# Patient Record
Sex: OTHER/UNKNOWN | Race: WHITE | URBAN - METROPOLITAN AREA
[De-identification: names, ages, dates, MRNs, and addresses within clinical notes are randomized per-mention and may not be internally consistent; named-entity substitution may affect disease eponyms.]

---

## 2017-08-02 ENCOUNTER — APPOINTMENT (OUTPATIENT)
Dept: URBAN - METROPOLITAN AREA CLINIC 290 | Age: 52
Setting detail: DERMATOLOGY
End: 2017-08-02

## 2017-08-02 DIAGNOSIS — Z41.9 ENCOUNTER FOR PROCEDURE FOR PURPOSES OTHER THAN REMEDYING HEALTH STATE, UNSPECIFIED: ICD-10-CM

## 2017-08-02 PROCEDURE — OTHER COSMETIC CONSULTATION: LHR: OTHER

## 2017-08-02 ASSESSMENT — LOCATION ZONE DERM: LOCATION ZONE: LEG

## 2017-08-02 ASSESSMENT — LOCATION SIMPLE DESCRIPTION DERM: LOCATION SIMPLE: LEFT THIGH

## 2017-08-02 ASSESSMENT — LOCATION DETAILED DESCRIPTION DERM: LOCATION DETAILED: LEFT ANTERIOR PROXIMAL THIGH

## 2017-10-13 ENCOUNTER — APPOINTMENT (OUTPATIENT)
Dept: URBAN - METROPOLITAN AREA CLINIC 290 | Age: 52
Setting detail: DERMATOLOGY
End: 2017-10-16

## 2017-10-13 DIAGNOSIS — Z41.9 ENCOUNTER FOR PROCEDURE FOR PURPOSES OTHER THAN REMEDYING HEALTH STATE, UNSPECIFIED: ICD-10-CM

## 2017-10-13 PROCEDURE — OTHER LASER HAIR REMOVAL: OTHER

## 2017-10-13 ASSESSMENT — LOCATION SIMPLE DESCRIPTION DERM
LOCATION SIMPLE: LEFT UPPER ARM
LOCATION SIMPLE: LEFT THIGH

## 2017-10-13 ASSESSMENT — LOCATION DETAILED DESCRIPTION DERM
LOCATION DETAILED: LEFT ANTERIOR MEDIAL DISTAL UPPER ARM
LOCATION DETAILED: LEFT ANTERIOR PROXIMAL THIGH

## 2017-10-13 ASSESSMENT — LOCATION ZONE DERM
LOCATION ZONE: LEG
LOCATION ZONE: ARM

## 2017-10-13 NOTE — PROCEDURE: LASER HAIR REMOVAL
Pulse Duration: auto
Number Of Prepaid Treatments (Will Not Render If 0): 0
Cooling: Serenity Head
Spot Size: 8 mm
Fluence (Will Not Render If 0): 22
Fluence (Will Not Render If 0): 20
Spot Size: Large S[employer]hire Optics
Total Pulses: 49
Anesthesia Type: 1% lidocaine with epinephrine
Total Pulses: 75
Pre-Procedure: Prior to proceeding the treatment areas were cleaned and all present put on their eye protection.
Price (Use Numbers Only, No Special Characters Or $): 0.00
Pulse Duration: 30 ms
Fluence (Will Not Render If 0): 21
Price (Use Numbers Only, No Special Characters Or $): 300.00
Laser Type: diode 810nm
Post-Procedure Care: Immediate endpoint: perifollicular erythema and edema. Post care reviewed with patient.
Treatment Number: 1
Render Post-Care In The Note: No
Cooling: chill tip
Total Pulses: 42
Detail Level: Zone

## 2017-11-27 ENCOUNTER — APPOINTMENT (OUTPATIENT)
Dept: URBAN - METROPOLITAN AREA CLINIC 290 | Age: 52
Setting detail: DERMATOLOGY
End: 2017-11-27

## 2017-11-27 DIAGNOSIS — Z41.9 ENCOUNTER FOR PROCEDURE FOR PURPOSES OTHER THAN REMEDYING HEALTH STATE, UNSPECIFIED: ICD-10-CM

## 2017-11-27 PROCEDURE — OTHER LASER HAIR REMOVAL: OTHER

## 2017-11-27 ASSESSMENT — LOCATION SIMPLE DESCRIPTION DERM
LOCATION SIMPLE: LEFT THIGH
LOCATION SIMPLE: LEFT UPPER ARM

## 2017-11-27 ASSESSMENT — LOCATION ZONE DERM
LOCATION ZONE: LEG
LOCATION ZONE: ARM

## 2017-11-27 NOTE — PROCEDURE: LASER HAIR REMOVAL
Cooling: Serenity Head
Spot Size: Large S[employer]hire Optics
Number Of Prepaid Treatments (Will Not Render If 0): 0
Pulse Duration: auto
Laser Type: diode 810nm
Total Pulses: 42
Fluence (Will Not Render If 0): 20
Pulse Duration: 30 ms
Pre-Procedure: Prior to proceeding the treatment areas were cleaned and all present put on their eye protection.
Spot Size: 8 mm
Price (Use Numbers Only, No Special Characters Or $): 180.00
Post-Procedure Care: Immediate endpoint: perifollicular erythema and edema. Post care reviewed with patient.
Detail Level: Zone
Total Pulses: 52
Treatment Number: 2
Fluence (Will Not Render If 0): 23
Render Post-Care In The Note: No
Fluence (Will Not Render If 0): 22
Anesthesia Type: 1% lidocaine with epinephrine
Cooling: chill tip
Price (Use Numbers Only, No Special Characters Or $): 0.00
Total Pulses: 77

## 2018-01-15 ENCOUNTER — APPOINTMENT (OUTPATIENT)
Dept: URBAN - METROPOLITAN AREA CLINIC 290 | Age: 53
Setting detail: DERMATOLOGY
End: 2018-01-15

## 2018-01-15 DIAGNOSIS — Z41.9 ENCOUNTER FOR PROCEDURE FOR PURPOSES OTHER THAN REMEDYING HEALTH STATE, UNSPECIFIED: ICD-10-CM

## 2018-01-15 PROCEDURE — OTHER LASER HAIR REMOVAL: OTHER

## 2018-01-15 ASSESSMENT — LOCATION ZONE DERM
LOCATION ZONE: LEG
LOCATION ZONE: ARM

## 2018-01-15 ASSESSMENT — LOCATION SIMPLE DESCRIPTION DERM
LOCATION SIMPLE: LEFT UPPER ARM
LOCATION SIMPLE: LEFT THIGH

## 2018-01-15 ASSESSMENT — LOCATION DETAILED DESCRIPTION DERM
LOCATION DETAILED: LEFT ANTERIOR MEDIAL PROXIMAL UPPER ARM
LOCATION DETAILED: LEFT ANTERIOR PROXIMAL THIGH

## 2018-01-15 NOTE — PROCEDURE: LASER HAIR REMOVAL
Number Of Prepaid Treatments (Will Not Render If 0): 0
Total Pulses: 42
Cooling: Serenity Head
Pulse Duration: auto
Fluence (Will Not Render If 0): 20
Total Pulses: 52
Spot Size: Large [employer]hire Optics
Fluence (Will Not Render If 0): 23
Price (Use Numbers Only, No Special Characters Or $): 180.00
Spot Size: 8 mm
Pulse Duration: 30 ms
Fluence (Will Not Render If 0): 22
Pre-Procedure: Prior to proceeding the treatment areas were cleaned and all present put on their eye protection.
Render Post-Care In The Note: No
Post-Procedure Care: Immediate endpoint: perifollicular erythema and edema. Post care reviewed with patient.
Laser Type: diode 810nm
Total Pulses: 78
Cooling: chill tip
Anesthesia Type: 1% lidocaine with epinephrine
Price (Use Numbers Only, No Special Characters Or $): 0.00
Treatment Number: 3
Detail Level: Zone

## 2018-03-09 ENCOUNTER — APPOINTMENT (OUTPATIENT)
Dept: URBAN - METROPOLITAN AREA CLINIC 290 | Age: 53
Setting detail: DERMATOLOGY
End: 2018-03-09

## 2018-03-09 DIAGNOSIS — Z41.9 ENCOUNTER FOR PROCEDURE FOR PURPOSES OTHER THAN REMEDYING HEALTH STATE, UNSPECIFIED: ICD-10-CM

## 2018-03-09 PROCEDURE — OTHER LASER HAIR REMOVAL: OTHER

## 2018-03-09 ASSESSMENT — LOCATION SIMPLE DESCRIPTION DERM
LOCATION SIMPLE: LEFT THIGH
LOCATION SIMPLE: LEFT UPPER ARM

## 2018-03-09 ASSESSMENT — LOCATION ZONE DERM
LOCATION ZONE: ARM
LOCATION ZONE: LEG

## 2018-03-09 ASSESSMENT — LOCATION DETAILED DESCRIPTION DERM
LOCATION DETAILED: LEFT ANTERIOR PROXIMAL THIGH
LOCATION DETAILED: LEFT ANTERIOR MEDIAL PROXIMAL UPPER ARM

## 2018-03-09 NOTE — PROCEDURE: LASER HAIR REMOVAL
Spot Size: Large S[employer]hire Optics
Fluence (Will Not Render If 0): 20
Cooling: Serenity Head
Pulse Duration: auto
Post-Procedure Care: Immediate endpoint: perifollicular erythema and edema. Post care reviewed with patient.
Spot Size: 8 mm
Fluence (Will Not Render If 0): 23
Number Of Prepaid Treatments (Will Not Render If 0): 0
Pulse Duration: 30 ms
Price (Use Numbers Only, No Special Characters Or $): 180.00
Total Pulses: 72
Total Pulses: 42
Laser Type: diode 810nm
Fluence (Will Not Render If 0): 22
Pre-Procedure: Prior to proceeding the treatment areas were cleaned and all present put on their eye protection.
Cooling: chill tip
Treatment Number: 4
Price (Use Numbers Only, No Special Characters Or $): 0.00
Render Post-Care In The Note: No
Anesthesia Type: 1% lidocaine with epinephrine
Detail Level: Zone
Total Pulses: 48

## 2018-04-26 ENCOUNTER — APPOINTMENT (OUTPATIENT)
Dept: URBAN - METROPOLITAN AREA CLINIC 290 | Age: 53
Setting detail: DERMATOLOGY
End: 2018-04-26

## 2018-04-26 DIAGNOSIS — Z41.9 ENCOUNTER FOR PROCEDURE FOR PURPOSES OTHER THAN REMEDYING HEALTH STATE, UNSPECIFIED: ICD-10-CM

## 2018-04-26 PROCEDURE — OTHER LASER HAIR REMOVAL: OTHER

## 2018-04-26 ASSESSMENT — LOCATION DETAILED DESCRIPTION DERM: LOCATION DETAILED: LEFT ANTERIOR PROXIMAL THIGH

## 2018-04-26 ASSESSMENT — LOCATION ZONE DERM: LOCATION ZONE: LEG

## 2018-04-26 ASSESSMENT — LOCATION SIMPLE DESCRIPTION DERM: LOCATION SIMPLE: LEFT THIGH

## 2018-04-26 NOTE — PROCEDURE: LASER HAIR REMOVAL
Number Of Prepaid Treatments (Will Not Render If 0): 0
Cooling: Serenity Head
Price (Use Numbers Only, No Special Characters Or $): 180.00
Pulse Duration: auto
Pulse Duration: 30 ms
Fluence (Will Not Render If 0): 22
Treatment Number: 5
Pre-Procedure: Prior to proceeding the treatment areas were cleaned and all present put on their eye protection.
Fluence (Will Not Render If 0): 20
Price (Use Numbers Only, No Special Characters Or $): 0.00
Total Pulses: 42
Laser Type: diode 810nm
Fluence (Will Not Render If 0): 23
Cooling: chill tip
Anesthesia Type: 1% lidocaine with epinephrine
Total Pulses: 76
Spot Size: Large [employer] Optics
Total Pulses: 52
Detail Level: Zone
Spot Size: 8 mm
Render Post-Care In The Note: No
Post-Procedure Care: Immediate endpoint: perifollicular erythema and edema. Post care reviewed with patient.

## 2018-06-15 ENCOUNTER — APPOINTMENT (OUTPATIENT)
Dept: URBAN - METROPOLITAN AREA CLINIC 290 | Age: 53
Setting detail: DERMATOLOGY
End: 2018-06-15

## 2018-06-15 DIAGNOSIS — Z41.9 ENCOUNTER FOR PROCEDURE FOR PURPOSES OTHER THAN REMEDYING HEALTH STATE, UNSPECIFIED: ICD-10-CM

## 2018-06-15 PROCEDURE — OTHER LASER HAIR REMOVAL: OTHER

## 2018-06-15 ASSESSMENT — LOCATION ZONE DERM
LOCATION ZONE: LEG
LOCATION ZONE: ARM

## 2018-06-15 ASSESSMENT — LOCATION DETAILED DESCRIPTION DERM
LOCATION DETAILED: LEFT ANTERIOR PROXIMAL UPPER ARM
LOCATION DETAILED: LEFT ANTERIOR PROXIMAL THIGH

## 2018-06-15 ASSESSMENT — LOCATION SIMPLE DESCRIPTION DERM
LOCATION SIMPLE: LEFT THIGH
LOCATION SIMPLE: LEFT UPPER ARM

## 2018-06-15 NOTE — HPI: COSMETIC (LASER HAIR REMOVAL)
Have You Had Laser Hair Removal Before?: has had previous treatment
When Outside In The Sun, Do You...: mostly tans, rarely burns
Number Of Treatments: 5

## 2018-06-15 NOTE — PROCEDURE: LASER HAIR REMOVAL
Total Pulses: 42
Pulse Duration: auto
Number Of Prepaid Treatments (Will Not Render If 0): 0
Spot Size: 8 mm
Treatment Number: 6
Cooling: Serenity Head
Spot Size: Large [employer]hire Optics
Cooling: chill tip
Fluence (Will Not Render If 0): 20
Post-Procedure Care: Immediate endpoint: perifollicular erythema and edema. Post care reviewed with patient.
Anesthesia Type: 1% lidocaine with epinephrine
Pre-Procedure: Prior to proceeding the treatment areas were cleaned and all present put on their eye protection.
Fluence (Will Not Render If 0): 23
Detail Level: Zone
Price (Use Numbers Only, No Special Characters Or $): 180.00
Pulse Duration: 30 ms
Price (Use Numbers Only, No Special Characters Or $): 0.00
Render Post-Care In The Note: No
Total Pulses: 46
Total Pulses: 78
Fluence (Will Not Render If 0): 22
Laser Type: diode 810nm